# Patient Record
Sex: FEMALE
[De-identification: names, ages, dates, MRNs, and addresses within clinical notes are randomized per-mention and may not be internally consistent; named-entity substitution may affect disease eponyms.]

---

## 2021-04-26 PROBLEM — Z00.00 ENCOUNTER FOR PREVENTIVE HEALTH EXAMINATION: Status: ACTIVE | Noted: 2021-04-26

## 2021-04-28 ENCOUNTER — APPOINTMENT (OUTPATIENT)
Dept: ORTHOPEDIC SURGERY | Facility: CLINIC | Age: 51
End: 2021-04-28
Payer: COMMERCIAL

## 2021-04-28 ENCOUNTER — TRANSCRIPTION ENCOUNTER (OUTPATIENT)
Age: 51
End: 2021-04-28

## 2021-04-28 VITALS — WEIGHT: 142 LBS | HEIGHT: 67 IN | BODY MASS INDEX: 22.29 KG/M2

## 2021-04-28 VITALS — BODY MASS INDEX: 22.29 KG/M2 | WEIGHT: 142 LBS | HEIGHT: 67 IN

## 2021-04-28 DIAGNOSIS — M54.2 CERVICALGIA: ICD-10-CM

## 2021-04-28 DIAGNOSIS — M25.511 PAIN IN RIGHT SHOULDER: ICD-10-CM

## 2021-04-28 DIAGNOSIS — M25.561 PAIN IN RIGHT KNEE: ICD-10-CM

## 2021-04-28 DIAGNOSIS — Z72.89 OTHER PROBLEMS RELATED TO LIFESTYLE: ICD-10-CM

## 2021-04-28 DIAGNOSIS — M25.512 PAIN IN RIGHT SHOULDER: ICD-10-CM

## 2021-04-28 DIAGNOSIS — Z78.9 OTHER SPECIFIED HEALTH STATUS: ICD-10-CM

## 2021-04-28 PROCEDURE — 99072 ADDL SUPL MATRL&STAF TM PHE: CPT

## 2021-04-28 PROCEDURE — 72050 X-RAY EXAM NECK SPINE 4/5VWS: CPT

## 2021-04-28 PROCEDURE — 99205 OFFICE O/P NEW HI 60 MIN: CPT | Mod: 25

## 2021-04-28 PROCEDURE — 73030 X-RAY EXAM OF SHOULDER: CPT | Mod: 50

## 2021-04-28 PROCEDURE — 73564 X-RAY EXAM KNEE 4 OR MORE: CPT | Mod: 50

## 2021-04-28 PROCEDURE — 20610 DRAIN/INJ JOINT/BURSA W/O US: CPT | Mod: RT

## 2021-04-28 RX ORDER — MELOXICAM 15 MG/1
15 TABLET ORAL DAILY
Qty: 30 | Refills: 2 | Status: COMPLETED | COMMUNITY
Start: 2021-04-28 | End: 2021-07-27

## 2021-04-28 RX ORDER — ALPRAZOLAM 0.5 MG/1
0.5 TABLET ORAL
Refills: 0 | Status: ACTIVE | COMMUNITY

## 2021-04-28 RX ORDER — CYCLOBENZAPRINE HYDROCHLORIDE 10 MG/1
10 TABLET, FILM COATED ORAL 3 TIMES DAILY
Qty: 60 | Refills: 0 | Status: ACTIVE | COMMUNITY
Start: 2021-04-28 | End: 1900-01-01

## 2021-04-28 NOTE — PHYSICAL EXAM
[de-identified] : Patient is well nourished, well-developed, in no acute distress, with appropriate mood and affect. The patient is oriented to time, place, and person. Gait evaluation does not reveal a limp. The skin examination does not reveal obvious lesions, lacerations, or ecchymosis.\par Right and left shoulder exam shows full AROM PROM some mild laxity bilaterally as in many of our swimmers but no instability or apprehension. \par Bilateral AC joint mild tenderness and positive Neer and Hemphill. \par SS IS Subscap 4+/5\par Neuro exam of hands and wrists WNL no motor or sensory focal deficits. \par C spine has some pain with hyperextension radiating to ginger scapular area\par Right knee exam tender at medial patella plica and medial facet patella. No medial or lateral joint tenderness neg Pradip and Lachman Quads 4/5 on right \par \par  [de-identified] : 4 views of knees show no DJD or abnormality of alignment or joint narrowing. No acute chnages. \par Cervical spine x ray show C6-7 ddd with narrowing and osteophyte. \par Shoulder xrays: 4 views of left and right shoulder show normal anatomy type 1 acromions and no AC jt or GH joint narrowing. No acute changes.

## 2021-04-28 NOTE — REVIEW OF SYSTEMS
[Feeling Tired] : fatigue [Recent Weight Gain (___ Lbs)] : recent ~M [unfilled] lbs weight gain [Constipation] : constipation [Sleep Disturbances] : ~T sleep disturbances [Muscle Weakness] : muscle weakness [Negative] : Heme/Lymph

## 2021-04-28 NOTE — PROCEDURE
[de-identified] : The right knee was prepped with alchohol and ethyl chloride was used to numb the skin. A 3 cc injection with 1 cc xylocaine, 1cc sensoricaine and 1 cc depomedrol , was given without complication into the knee joint. Patient instructed that there may be an inflammatory flare for 24 hrs , to use ice or advil if needed\par \par

## 2021-04-28 NOTE — HISTORY OF PRESENT ILLNESS
[de-identified] : 50 y/o female presents for right knee pain, bilateral shoulders, and neck pain. She states that her Right Knee is a new problem.  Pain began 3/15/21 while in [place]. She states while descending steps she felt a sharp pain throughout her knee cap which limited her activities. She treated her pain with Advil and ice. Currently, she describes her pain as dull with throbbing medial discomfort.\par  In terms of her bilateral shoulder, Ms. Mancia is Left hand dominant and grew up as a competitive swimmer. At the young age of 13/14 years old she was dx with bursitis and treated with PT, following in college for her neck pain to begin. She had a MRI done and was informed that she had a pinched nerve and to refrain from swimming. Presently, she has been experiencing tightness in her neck and Right shoulder > Left which she believes iis aggravated by  her present hobby golfing.

## 2021-04-28 NOTE — DISCUSSION/SUMMARY
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 \par Both shoulders have mild hyperlaxity but no apprehension or instability. Some mild AC joint and RC symtpoms. However I think the bigger issue  is her C6-7 radiculopathy .\par Plan\par Meloxicam\par Knee injection cortisone\par Flexeril prn\par Physical therapy for all issues\par F/U in 6 weeks  to see if further work up is needed. \par Probably will need cervical MRI. \par \par

## 2021-05-18 ENCOUNTER — APPOINTMENT (OUTPATIENT)
Dept: ORTHOPEDIC SURGERY | Facility: CLINIC | Age: 51
End: 2021-05-18

## 2021-06-09 ENCOUNTER — APPOINTMENT (OUTPATIENT)
Dept: ORTHOPEDIC SURGERY | Facility: CLINIC | Age: 51
End: 2021-06-09
Payer: COMMERCIAL

## 2021-06-09 DIAGNOSIS — M67.51 PLICA SYNDROME, RIGHT KNEE: ICD-10-CM

## 2021-06-09 PROCEDURE — 99072 ADDL SUPL MATRL&STAF TM PHE: CPT

## 2021-06-09 PROCEDURE — 99214 OFFICE O/P EST MOD 30 MIN: CPT

## 2021-06-09 RX ORDER — METHYLPREDNISOLONE 4 MG/1
4 TABLET ORAL
Qty: 1 | Refills: 0 | Status: ACTIVE | COMMUNITY
Start: 2021-06-09 | End: 1900-01-01
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
## 2021-06-09 NOTE — DISCUSSION/SUMMARY
[de-identified] : Plica knee issue markedlly improved with injection and PT\par Both sjhoulders gaining strength to control instability. \par The remaining issue is a C6-7 radiculopathy to the left upper extremity. \par Plan MRI R/O HNP C6-7-T1\par Medrol dose pack x 6 days and then return to meloxicam. \par Continue PT for C spine formally and home exercises for shoulder and knee PRE\par

## 2021-06-09 NOTE — PHYSICAL EXAM
[de-identified] : Pain on hyperextension of C spine continues with left sided radicular symptoms of trap spasm and C6 numbness and tingling. No motor weakness\par Bilateral shoulder strength markedly improved no apprehension today left or right shoulder\par Bilateral knee exam shows no effusion and . excellent improved Quad strength. \par \par

## 2021-07-21 ENCOUNTER — APPOINTMENT (OUTPATIENT)
Dept: ORTHOPEDIC SURGERY | Facility: CLINIC | Age: 51
End: 2021-07-21
Payer: COMMERCIAL

## 2021-07-21 VITALS — WEIGHT: 142 LBS | BODY MASS INDEX: 32.86 KG/M2 | HEIGHT: 55 IN

## 2021-07-21 VITALS — BODY MASS INDEX: 22.29 KG/M2 | HEIGHT: 67 IN | WEIGHT: 142 LBS

## 2021-07-21 DIAGNOSIS — M25.312 OTHER INSTABILITY, RIGHT SHOULDER: ICD-10-CM

## 2021-07-21 DIAGNOSIS — M25.311 OTHER INSTABILITY, RIGHT SHOULDER: ICD-10-CM

## 2021-07-21 PROCEDURE — 99072 ADDL SUPL MATRL&STAF TM PHE: CPT

## 2021-07-21 PROCEDURE — 99214 OFFICE O/P EST MOD 30 MIN: CPT

## 2021-07-21 RX ORDER — MELOXICAM 15 MG/1
15 TABLET ORAL DAILY
Qty: 30 | Refills: 3 | Status: COMPLETED | COMMUNITY
Start: 2021-07-21 | End: 2021-11-18

## 2021-07-21 NOTE — PHYSICAL EXAM
[de-identified] : Cervical spine exam reveals no motor loss or sensor loss but pain with left and right neck tilt and paraesthesias in the C-6 7 distribution. \par SHoulders full AROM mild click on abduction ER good RC isometric strength. \par

## 2021-07-21 NOTE — DISCUSSION/SUMMARY
[de-identified] : Both shoulder and knee issues have improved considerably with PT and meloxicam. \par Cervical radiculopathy remains the main issue and I believe she may have aC6-C7 HNP or stenosis. \par SHe has already been in PT for over 10 weeks for this and the other two probl;ems and we will now continue to concentrate on the C spine only. \par \par PLan \par MRI cervical spine to rule out HNP or stenosis. \par Continue cervical spine PT and home shoulder PRE

## 2021-07-21 NOTE — HISTORY OF PRESENT ILLNESS
[de-identified] : 52 y/o female returns for follow-up of   knee, bilateral shoulders and cervical radiculopathy. \par  Patient states that her knees are doing well and is happy with her progress made. In fact she has returned to swimming regularly with no knee pain on flutter or frog kick. \par Her shoulder strength is improving as well and she can swim with bilateral breathing with only minimal discomfort but is not at competitive levels yet. \par The cervical radiculopathy remains the main residual issue which effects both c spine and trapezial discomfort plus the shoulder discomfort however the severe \par radicular pain  down the whole arm has dissipated. \par Unfortunately she was unable to obtain a MRI due to some  insurance questions/ issue but now was able to make a new appointment with R for this Friday We will send this new note along since this test is required. Interestingly if she stays on the meloxicam daily her headaches and neck pain are markedly improved but return if she is off for moree than 2-3 days. \par

## 2021-08-05 ENCOUNTER — NON-APPOINTMENT (OUTPATIENT)
Age: 51
End: 2021-08-05

## 2021-08-05 DIAGNOSIS — M50.123 CERVICAL DISC DISORDER AT C6-C7 LEVEL WITH RADICULOPATHY: ICD-10-CM

## 2021-08-09 ENCOUNTER — APPOINTMENT (OUTPATIENT)
Dept: ORTHOPEDIC SURGERY | Facility: CLINIC | Age: 51
End: 2021-08-09

## 2021-08-12 PROBLEM — M50.123 CERVICAL DISC DISORDER AT C6-C7 LEVEL WITH RADICULOPATHY: Status: ACTIVE | Noted: 2021-04-28

## 2021-08-25 ENCOUNTER — APPOINTMENT (OUTPATIENT)
Dept: ORTHOPEDIC SURGERY | Facility: CLINIC | Age: 51
End: 2021-08-25
Payer: COMMERCIAL

## 2021-08-25 VITALS — WEIGHT: 142 LBS | HEIGHT: 67 IN | BODY MASS INDEX: 22.29 KG/M2

## 2021-08-25 DIAGNOSIS — Z82.49 FAMILY HISTORY OF ISCHEMIC HEART DISEASE AND OTHER DISEASES OF THE CIRCULATORY SYSTEM: ICD-10-CM

## 2021-08-25 DIAGNOSIS — M50.30 OTHER CERVICAL DISC DEGENERATION, UNSPECIFIED CERVICAL REGION: ICD-10-CM

## 2021-08-25 DIAGNOSIS — Z78.9 OTHER SPECIFIED HEALTH STATUS: ICD-10-CM

## 2021-08-25 DIAGNOSIS — Z82.61 FAMILY HISTORY OF ARTHRITIS: ICD-10-CM

## 2021-08-25 PROCEDURE — 99204 OFFICE O/P NEW MOD 45 MIN: CPT

## 2021-08-25 NOTE — REASON FOR VISIT
[Initial Consultation] : an initial consultation for [Neck Pain] : neck pain [Radiculopathy] : radiculopathy

## 2021-08-30 PROBLEM — Z82.49 FAMILY HISTORY OF CARDIAC DISORDER: Status: ACTIVE | Noted: 2021-08-25

## 2021-08-30 PROBLEM — Z78.9 NO PERTINENT PAST MEDICAL HISTORY: Status: RESOLVED | Noted: 2021-08-25 | Resolved: 2021-08-30

## 2021-08-30 PROBLEM — M50.30 DEGENERATIVE CERVICAL DISC: Status: ACTIVE | Noted: 2021-08-30

## 2021-08-30 PROBLEM — Z78.9 CONSUMES ALCOHOL OCCASIONALLY: Status: ACTIVE | Noted: 2021-08-25

## 2021-08-30 PROBLEM — Z82.61 FAMILY HISTORY OF ARTHRITIS: Status: ACTIVE | Noted: 2021-08-25

## 2021-08-30 PROBLEM — Z78.9 DOES NOT USE ILLICIT DRUGS: Status: ACTIVE | Noted: 2021-08-25

## 2021-08-30 NOTE — PHYSICAL EXAM
[de-identified] : Physical Exam:\par \par General: patient is well developed, well nourished, in no acute \par distress, alert and oriented x 3. \par \par Mood and affect: normal\par \par Respiratory: no respiratory distress noted\par \par Skin: no scars over spine, skin intact, no erythema, increased warmth\par \par Alignment:The spine is well compensated in the coronal and sagittal plane. \par \par Gait: The patient is able to toe walk and heel walk without difficulty. \par \par Palpation: no tenderness to palpation cervical spine or paraspinal region\par \par Range of motion: Cervical spine ROM is full\par \par Neurologic Exam:\par Motor: Manual Muscle testing in the upper and lower extremities is 5 out of 5 in all muscle groups. There is no evidence of muscular atrophy in the upper extremities. Sensory: Sensation to light touch is grossly intact in the upper and lower extremities\par \par Reflexes: DTR are present and symmetric throughout, negative de leon bilaterally, negative inverted radial reflex bilaterally, no clonus, plantar responses are flexor\par \par Special tests: Spurlings sign absent. Lhermitte's sign is absent. 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

## 2021-08-30 NOTE — HISTORY OF PRESENT ILLNESS
[de-identified] : Ms. FRANZ is a very pleasant 51 year old female who complains of neck pain that radiates to her shoulders and down left upper extremity for years, atraumatic. Pain interferes with activities and exercise. Has had PT and taken NSAIDs.\par \par The patient no history of previous spine surgery.\par \par The patient has no history of unexpected weight loss, no history of active cancer, no history bladder or bowel dysfunction, no night pain, no fevers or chills.\par \par The past medical history, surgical history, family history, allergies, medications, 10+ point review of systems, family history and social history were reviewed and non contributory.\par \par

## 2021-08-30 NOTE — DISCUSSION/SUMMARY
[de-identified] : Discussed my findings with the patient. There is no indication for surgical intervention at this time. Conservative treatment options discussed. I am recommending a course of physical therapy, referral given. Patient will follow up with Dr. Nguyen for pain management and further conservative measures. She will follow up with me as needed. All questions answered.

## 2021-11-30 ENCOUNTER — TRANSCRIPTION ENCOUNTER (OUTPATIENT)
Age: 51
End: 2021-11-30

## 2021-12-01 ENCOUNTER — TRANSCRIPTION ENCOUNTER (OUTPATIENT)
Age: 51
End: 2021-12-01

## 2021-12-30 ENCOUNTER — RX RENEWAL (OUTPATIENT)
Age: 51
End: 2021-12-30

## 2021-12-30 RX ORDER — MELOXICAM 15 MG/1
15 TABLET ORAL DAILY
Qty: 30 | Refills: 0 | Status: ACTIVE | COMMUNITY
Start: 2021-11-30 | End: 1900-01-01

## 2022-05-04 ENCOUNTER — RX RENEWAL (OUTPATIENT)
Age: 52
End: 2022-05-04

## 2022-06-20 ENCOUNTER — NEW PATIENT COMPREHENSIVE (OUTPATIENT)
Dept: CLINIC 2 | Facility: CLINIC | Age: 52
End: 2022-06-20

## 2022-06-20 DIAGNOSIS — H52.4: ICD-10-CM

## 2022-06-20 PROCEDURE — 92002 INTRM OPH EXAM NEW PATIENT: CPT

## 2022-06-20 PROCEDURE — 92015 DETERMINE REFRACTIVE STATE: CPT

## 2022-06-20 ASSESSMENT — VISUAL ACUITY
OU_CC: J1+/-
OD_CC: 20/20-2
OS_CC: 20/20

## 2022-06-20 ASSESSMENT — TONOMETRY
OD_IOP_MMHG: 14
OS_IOP_MMHG: 15

## 2022-06-20 ASSESSMENT — KERATOMETRY
OS_AXISANGLE2_DEGREES: 145
OS_K1POWER_DIOPTERS: 42.75
OS_AXISANGLE_DEGREES: 55
OD_K1POWER_DIOPTERS: 42.25
OS_K2POWER_DIOPTERS: 43.00
OD_AXISANGLE2_DEGREES: 31
OD_AXISANGLE_DEGREES: 121
OD_K2POWER_DIOPTERS: 43.00

## 2023-08-31 ENCOUNTER — NON-APPOINTMENT (OUTPATIENT)
Age: 53
End: 2023-08-31

## 2023-09-07 ENCOUNTER — APPOINTMENT (OUTPATIENT)
Dept: ORTHOPEDIC SURGERY | Facility: CLINIC | Age: 53
End: 2023-09-07
Payer: COMMERCIAL

## 2023-09-07 VITALS — WEIGHT: 148 LBS | BODY MASS INDEX: 23.23 KG/M2 | HEIGHT: 67 IN | RESPIRATION RATE: 16 BRPM

## 2023-09-07 DIAGNOSIS — M77.11 LATERAL EPICONDYLITIS, RIGHT ELBOW: ICD-10-CM

## 2023-09-07 PROCEDURE — 73070 X-RAY EXAM OF ELBOW: CPT | Mod: RT

## 2023-09-07 PROCEDURE — 99214 OFFICE O/P EST MOD 30 MIN: CPT

## 2023-09-07 NOTE — ASSESSMENT
[FreeTextEntry1] : Patient with 7-week history of lateral epicondylitis.  Might be getting better over the last 3 to 4 days.  We discussed options including injection, therapy or home program.  She would like to try home program and a counterforce brace.  If it does not improve she can always return for consideration of a lateral epicondyle injection.

## 2023-09-07 NOTE — PHYSICAL EXAM
[de-identified] : Evaluation of the right elbow demonstrates point tenderness at the lateral epicondyle.  She has slight pain with resisted middle finger as well as wrist extension.  Biceps and triceps intact.  Nontender over radial tunnel [de-identified] : PA and lateral x-ray of the right elbow is normal

## 2023-09-07 NOTE — HISTORY OF PRESENT ILLNESS
[Left] : left hand dominant [FreeTextEntry1] : Patient presents for evaluation of for right lateral elbow pain with lifting, morning symptoms, which she attributes to playing pickle ball and golf since July 2023.  Patient has been wearing a protective sleeve over the elbow without any relief.  She denies numbness and tingling.  Patient has been taking meloxicam which was prescribed for her shoulder pain without relief.

## 2023-09-21 ENCOUNTER — APPOINTMENT (OUTPATIENT)
Dept: ORTHOPEDIC SURGERY | Facility: CLINIC | Age: 53
End: 2023-09-21